# Patient Record
Sex: FEMALE | Race: WHITE | HISPANIC OR LATINO | URBAN - METROPOLITAN AREA
[De-identification: names, ages, dates, MRNs, and addresses within clinical notes are randomized per-mention and may not be internally consistent; named-entity substitution may affect disease eponyms.]

---

## 2017-03-31 ENCOUNTER — TELEPHONE (OUTPATIENT)
Dept: ENDOCRINOLOGY | Facility: CLINIC | Age: 37
End: 2017-03-31

## 2017-03-31 NOTE — TELEPHONE ENCOUNTER
----- Message from Wes Clark sent at 3/31/2017  2:12 PM CDT -----  Contact: Pt   Pt is requesting a call back from staff for scheduling    Pt has referral in system for rheumatology    Pt can be reached at [phone]

## 2019-10-17 ENCOUNTER — OFFICE VISIT (OUTPATIENT)
Dept: OBSTETRICS AND GYNECOLOGY | Facility: CLINIC | Age: 39
End: 2019-10-17
Payer: COMMERCIAL

## 2019-10-17 VITALS
HEIGHT: 62 IN | BODY MASS INDEX: 34.45 KG/M2 | DIASTOLIC BLOOD PRESSURE: 90 MMHG | WEIGHT: 187.19 LBS | SYSTOLIC BLOOD PRESSURE: 124 MMHG

## 2019-10-17 DIAGNOSIS — Z12.31 VISIT FOR SCREENING MAMMOGRAM: ICD-10-CM

## 2019-10-17 DIAGNOSIS — Z12.4 PAP SMEAR FOR CERVICAL CANCER SCREENING: ICD-10-CM

## 2019-10-17 DIAGNOSIS — Z01.419 VISIT FOR GYNECOLOGIC EXAMINATION: ICD-10-CM

## 2019-10-17 DIAGNOSIS — R10.2 PELVIC PAIN: Primary | ICD-10-CM

## 2019-10-17 DIAGNOSIS — N92.6 IRREGULAR PERIODS/MENSTRUAL CYCLES: ICD-10-CM

## 2019-10-17 LAB
BILIRUB SERPL-MCNC: NORMAL MG/DL
BLOOD URINE, POC: NORMAL
COLOR, POC UA: YELLOW
GLUCOSE UR QL STRIP: NORMAL
KETONES UR QL STRIP: NORMAL
LEUKOCYTE ESTERASE URINE, POC: NORMAL
NITRITE, POC UA: NORMAL
PH, POC UA: 6
PROTEIN, POC: NORMAL
SPECIFIC GRAVITY, POC UA: 1
UROBILINOGEN, POC UA: NORMAL

## 2019-10-17 PROCEDURE — 81002 URINALYSIS NONAUTO W/O SCOPE: CPT | Mod: S$GLB,,, | Performed by: NURSE PRACTITIONER

## 2019-10-17 PROCEDURE — 88175 CYTOPATH C/V AUTO FLUID REDO: CPT

## 2019-10-17 PROCEDURE — 87624 HPV HI-RISK TYP POOLED RSLT: CPT

## 2019-10-17 PROCEDURE — 99386 PR PREVENTIVE VISIT,NEW,40-64: ICD-10-PCS | Mod: 25,S$GLB,, | Performed by: NURSE PRACTITIONER

## 2019-10-17 PROCEDURE — 99999 PR PBB SHADOW E&M-EST. PATIENT-LVL III: CPT | Mod: PBBFAC,,, | Performed by: NURSE PRACTITIONER

## 2019-10-17 PROCEDURE — 99999 PR PBB SHADOW E&M-EST. PATIENT-LVL III: ICD-10-PCS | Mod: PBBFAC,,, | Performed by: NURSE PRACTITIONER

## 2019-10-17 PROCEDURE — 81002 POCT URINE DIPSTICK WITHOUT MICROSCOPE: ICD-10-PCS | Mod: S$GLB,,, | Performed by: NURSE PRACTITIONER

## 2019-10-17 PROCEDURE — 99386 PREV VISIT NEW AGE 40-64: CPT | Mod: 25,S$GLB,, | Performed by: NURSE PRACTITIONER

## 2019-10-17 RX ORDER — LORAZEPAM 1 MG/1
TABLET ORAL
COMMUNITY
Start: 2016-09-02

## 2019-10-17 RX ORDER — DEXTROAMPHETAMINE SACCHARATE, AMPHETAMINE ASPARTATE, DEXTROAMPHETAMINE SULFATE AND AMPHETAMINE SULFATE 5; 5; 5; 5 MG/1; MG/1; MG/1; MG/1
TABLET ORAL
COMMUNITY
Start: 2019-08-14

## 2019-10-17 NOTE — PROGRESS NOTES
"  CC: Annual  HPI: Pt is a 39 y.o.  female who presents for routine annual exam. She uses BTL for contraception. She does not want STD screening. She is , four kids at home.  with her today. She has not been to the GYN since 2017. She had a right dermoid cyst removal via Dr. Zuniga at [facility] in Jan 2017. States she had a "horrible" experience and never followed up again. She has been having intermittent R sided pelvic pain since the surgery. It is random when it occurs, lasts anywhere from minutes to hours. Motrin gives moderate relief. No pain on left side. Pain has been gradually worsening over the past 2 years. Pain varies but is sometimes sharp and then sore. Feels like pain is worse than after her cesareans. Cycles are irregular, longest without a cycle has been 2 months. Cycles are more painful than when she was younger, hesitant about hormonal birth control. She does not smoke cigarettes. Denies family hx of breast cancer. Takes Ativan nightly, uses Adderall prn. Sees an outside PCP regularly.       ROS:  GENERAL: Feeling well overall. Denies fever or chills.   SKIN: Denies rash or lesions.   HEAD: Denies head injury or headache.   NODES: Denies enlarged lymph nodes.   CHEST: Denies chest pain or shortness of breath.   CARDIOVASCULAR: Denies palpitations or left sided chest pain.   ABDOMEN: No abdominal pain, constipation, diarrhea, nausea, vomiting or rectal bleeding.   URINARY: No dysuria, hematuria, or burning on urination.  REPRODUCTIVE: See HPI.   BREASTS: Denies pain, lumps, or nipple discharge.   HEMATOLOGIC: No easy bruisability or excessive bleeding.   MUSCULOSKELETAL: Denies joint pain or swelling.   NEUROLOGIC: Denies syncope or weakness.   PSYCHIATRIC: Denies depression, anxiety or mood swings.    PE:   APPEARANCE: Well nourished, well developed,    Other  White female in no acute distress.  NODES: no cervical, supraclavicular, or inguinal lymphadenopathy  BREASTS: Symmetrical, no skin " changes or visible lesions. No palpable masses, nipple discharge or adenopathy bilaterally.  ABDOMEN: Soft. No tenderness or masses. No distention. No hernias palpated. No CVA tenderness.  VULVA: No lesions. Normal external female genitalia.  URETHRAL MEATUS: Normal size and location, no lesions, no prolapse.  URETHRA: No masses, tenderness, or prolapse.  VAGINA: Moist. No lesions or lacerations noted. No abnormal discharge present. No odor present.   CERVIX: No lesions or discharge. No cervical motion tenderness.   UTERUS: Normal size, regular shape, mobile, non-tender.  ADNEXA: No tenderness. No fullness or masses palpated in the adnexal regions.   ANUS PERINEUM: Normal.      Diagnosis:  1. Pelvic pain    2. Visit for gynecologic examination    3. Visit for screening mammogram    4. Pap smear for cervical cancer screening    5. Irregular periods/menstrual cycles        Plan:     Orders Placed This Encounter    HPV High Risk Genotypes, PCR    Mammo Digital Screening Bilat With CAD    US Pelvis Comp with Transvag NON-OB (xpd    POCT URINE DIPSTICK WITHOUT MICROSCOPE    POCT urine pregnancy    Liquid-based pap smear, screening     1. Pap/HPV  2. Pelvic u/s  3. Release of records from surgery  4. MMG    D/w pt f/u with Dr. Pena for pelvic pain if my work-up is negative. Pt chooses to focus on the pain vs cycles at this point. She is not a fan of medication and prefers to avoid them if possible.  Patient was counseled today on the new ACS guidelines for cervical cytology screening as well as the current recommendations for breast cancer screening. She was counseled to follow up with her PCP for other routine health maintenance. Counseling session lasted approximately 10 minutes, and all her questions were answered.    Follow-up with me in 1 year for routine exam; pap in 3 years.

## 2019-10-18 ENCOUNTER — PATIENT MESSAGE (OUTPATIENT)
Dept: OBSTETRICS AND GYNECOLOGY | Facility: CLINIC | Age: 39
End: 2019-10-18

## 2019-10-18 ENCOUNTER — HOSPITAL ENCOUNTER (OUTPATIENT)
Dept: RADIOLOGY | Facility: OTHER | Age: 39
Discharge: HOME | End: 2019-10-18
Attending: NURSE PRACTITIONER
Payer: COMMERCIAL

## 2019-10-18 DIAGNOSIS — R10.2 PELVIC PAIN: ICD-10-CM

## 2019-10-18 PROCEDURE — 76830 US PELVIS COMP WITH TRANSVAG NON-OB (XPD): ICD-10-PCS | Mod: 26,,, | Performed by: RADIOLOGY

## 2019-10-18 PROCEDURE — 76830 TRANSVAGINAL US NON-OB: CPT | Mod: TC

## 2019-10-18 PROCEDURE — 76856 US EXAM PELVIC COMPLETE: CPT | Mod: 26,,, | Performed by: RADIOLOGY

## 2019-10-18 PROCEDURE — 76856 US PELVIS COMP WITH TRANSVAG NON-OB (XPD): ICD-10-PCS | Mod: 26,,, | Performed by: RADIOLOGY

## 2019-10-18 PROCEDURE — 76830 TRANSVAGINAL US NON-OB: CPT | Mod: 26,,, | Performed by: RADIOLOGY

## 2019-10-18 PROCEDURE — 76856 US EXAM PELVIC COMPLETE: CPT | Mod: TC

## 2019-10-21 ENCOUNTER — TELEPHONE (OUTPATIENT)
Dept: OBSTETRICS AND GYNECOLOGY | Facility: CLINIC | Age: 39
End: 2019-10-21

## 2019-10-21 NOTE — TELEPHONE ENCOUNTER
----- Message from Mena Mitchell NP sent at 10/21/2019 12:06 PM CDT -----  Please contact pt- pelvic ultrasound was normal. Please set up with Dr. Pena for chronic pelvic pain.

## 2019-10-23 LAB
HPV HR 12 DNA CVX QL NAA+PROBE: NEGATIVE
HPV16 AG SPEC QL: NEGATIVE
HPV18 DNA SPEC QL NAA+PROBE: NEGATIVE

## 2019-10-24 ENCOUNTER — PATIENT MESSAGE (OUTPATIENT)
Dept: OBSTETRICS AND GYNECOLOGY | Facility: CLINIC | Age: 39
End: 2019-10-24

## 2019-10-25 ENCOUNTER — TELEPHONE (OUTPATIENT)
Dept: OBSTETRICS AND GYNECOLOGY | Facility: CLINIC | Age: 39
End: 2019-10-25

## 2019-10-25 ENCOUNTER — PATIENT MESSAGE (OUTPATIENT)
Dept: OBSTETRICS AND GYNECOLOGY | Facility: CLINIC | Age: 39
End: 2019-10-25

## 2019-10-25 NOTE — TELEPHONE ENCOUNTER
LVM:  Good Morning Ms Haddad, this is Lyndsay from Dr Pena's office.  NAWAF Mitchell asked us to call you to schedule you with Dr Pena's pelvic pain clinic.   Please call our office back at [phone].    Thank You

## 2019-10-25 NOTE — TELEPHONE ENCOUNTER
----- Message from Danilo Jonas MA sent at 10/24/2019  3:38 PM CDT -----  Ronald Umana,     This is the pt we discussed. Thanks for your help!!!!!!!!!    Liza,  I would like to schdeule  an appt with DR. DUARTE as Dr. Mitchell suggested.  Any evening around 430pm after Nove 6th would work for me. Thank You!

## 2019-11-15 ENCOUNTER — TELEPHONE (OUTPATIENT)
Dept: OBSTETRICS AND GYNECOLOGY | Facility: CLINIC | Age: 39
End: 2019-11-15

## 2019-11-25 ENCOUNTER — TELEPHONE (OUTPATIENT)
Dept: OBSTETRICS AND GYNECOLOGY | Facility: CLINIC | Age: 39
End: 2019-11-25

## 2019-11-25 ENCOUNTER — PATIENT MESSAGE (OUTPATIENT)
Dept: OBSTETRICS AND GYNECOLOGY | Facility: CLINIC | Age: 39
End: 2019-11-25

## 2019-11-25 ENCOUNTER — OFFICE VISIT (OUTPATIENT)
Dept: OBSTETRICS AND GYNECOLOGY | Facility: CLINIC | Age: 39
End: 2019-11-25
Payer: COMMERCIAL

## 2019-11-25 VITALS
BODY MASS INDEX: 34.64 KG/M2 | HEIGHT: 62 IN | DIASTOLIC BLOOD PRESSURE: 78 MMHG | WEIGHT: 188.25 LBS | SYSTOLIC BLOOD PRESSURE: 118 MMHG

## 2019-11-25 DIAGNOSIS — R10.31 CHRONIC RLQ PAIN: Primary | ICD-10-CM

## 2019-11-25 DIAGNOSIS — G89.29 CHRONIC RLQ PAIN: Primary | ICD-10-CM

## 2019-11-25 PROCEDURE — 99214 PR OFFICE/OUTPT VISIT, EST, LEVL IV, 30-39 MIN: ICD-10-PCS | Mod: 57,S$GLB,, | Performed by: OBSTETRICS & GYNECOLOGY

## 2019-11-25 PROCEDURE — 3008F BODY MASS INDEX DOCD: CPT | Mod: CPTII,S$GLB,, | Performed by: OBSTETRICS & GYNECOLOGY

## 2019-11-25 PROCEDURE — 99214 OFFICE O/P EST MOD 30 MIN: CPT | Mod: 57,S$GLB,, | Performed by: OBSTETRICS & GYNECOLOGY

## 2019-11-25 PROCEDURE — 99999 PR PBB SHADOW E&M-EST. PATIENT-LVL III: ICD-10-PCS | Mod: PBBFAC,,, | Performed by: OBSTETRICS & GYNECOLOGY

## 2019-11-25 PROCEDURE — 99999 PR PBB SHADOW E&M-EST. PATIENT-LVL III: CPT | Mod: PBBFAC,,, | Performed by: OBSTETRICS & GYNECOLOGY

## 2019-11-25 PROCEDURE — 3008F PR BODY MASS INDEX (BMI) DOCUMENTED: ICD-10-PCS | Mod: CPTII,S$GLB,, | Performed by: OBSTETRICS & GYNECOLOGY

## 2019-11-25 NOTE — PROGRESS NOTES
Subjective:       Patient ID: Kristin Haddad is a 39 y.o. female.    Chief Complaint:  Pelvic Pain      History of Present Illness  HPI  Pt is 39 y.o. with Patient's last menstrual period was 2019. here to discuss options for her chronic RLQ pain.    Pt has hx of 4 prior c/s (all through a vertical skin incision).  Pt started to have sharp pain on right side in .  Had an u/s which found a dermoid.  An open ovarian cystectomy was performed and at that time, she was told that she had lots of scar tissue abdominally.  Since the surgery, she has gained lots of weight.  But now she has more menstrual / PMS sx.  Pain is during and 1-2 weeks after period.  To her, it feels like scar tissue is stuck to everything -- but isolated to right side.    If she walks a lot, the incision feels like it would pop open.    Pain can be random.  Not moving makes pain better.  rx with motrin.      ocp's affected her hormonally.      Most recent u/s found the following:  FINDINGS:  The uterus measures 8.6 x 4.1 x 5.6 cm and has a homogeneous echotexture.  Endometrial stripe measures 0.7 cm.  The right and left ovaries measure 3.1 x 2.1 x 2.1 cm and 2.4 x 1.8 x 1.7 cm respectively.  The ovaries have a normal appearance.  Note is made of fluid within the cervix and several nabothian cysts.      Impression       Mild amount of fluid within the cervix.          GYN & OB History  Patient's last menstrual period was 2019.   Date of Last Pap: 10/25/2019    OB History    Para Term  AB Living   7 4     3 4   SAB TAB Ectopic Multiple Live Births           4      # Outcome Date GA Lbr Beny/2nd Weight Sex Delivery Anes PTL Lv   7 AB            6 AB            5 AB            4 Para      CS-Unspec   TARSHA   3 Para      CS-Unspec   TARSHA   2 Para      CS-Unspec   TARSHA   1 Para      CS-Unspec   TARSHA       Review of Systems  Review of Systems   Constitutional: Negative for activity change, appetite change and fatigue.   HENT:  Negative.  Negative for tinnitus.    Eyes: Negative.    Respiratory: Negative for cough and shortness of breath.    Cardiovascular: Negative for chest pain and palpitations.   Gastrointestinal: Negative.  Negative for abdominal pain, blood in stool, constipation, diarrhea and nausea.   Endocrine: Negative.  Negative for hot flashes.   Genitourinary: Positive for pelvic pain. Negative for dysmenorrhea, dyspareunia, dysuria, frequency, menstrual problem, vaginal discharge, urinary incontinence and postcoital bleeding.   Musculoskeletal: Negative for back pain and joint swelling.   Integumentary:  Negative for rash, breast mass, nipple discharge and breast skin changes.   Neurological: Negative.  Negative for headaches.   Hematological: Negative.  Does not bruise/bleed easily.   Psychiatric/Behavioral: The patient is not nervous/anxious.    Breast: negative.  Negative for mass, nipple discharge and skin changes          Objective:    Physical Exam:   Constitutional: She is oriented to person, place, and time. She appears well-developed and well-nourished. No distress.    HENT:   Head: Normocephalic and atraumatic.    Eyes: Pupils are equal, round, and reactive to light. Conjunctivae and lids are normal.    Neck: Normal range of motion and full passive range of motion without pain. Neck supple.    Cardiovascular: Normal rate and regular rhythm.  Exam reveals no edema.     Pulmonary/Chest: Effort normal and breath sounds normal. She has no wheezes.        Abdominal: Soft. Normal appearance and bowel sounds are normal. She exhibits no distension. There is no tenderness. There is no rebound and no guarding.         Genitourinary: Vagina normal and uterus normal. Pelvic exam was performed with patient supine. There is no tenderness or lesion on the right labia. There is no tenderness or lesion on the left labia. Uterus is not deviated, not fixed and not hosting fibroids. Cervix is normal. Right adnexum displays tenderness.  Right adnexum displays no mass. Left adnexum displays no mass and no tenderness. No rectocele, cystocele or unspecified prolapse of vaginal walls in the vagina. No vaginal discharge found. Labial bartholins normal.Cervix exhibits no motion tenderness and no discharge.   Genitourinary Comments: No mass palpated.  Normal pelvic floor             Musculoskeletal: Normal range of motion and moves all extremeties.       Neurological: She is alert and oriented to person, place, and time. She has normal strength.    Skin: Skin is warm and dry.    Psychiatric: She has a normal mood and affect. Her speech is normal and behavior is normal. Thought content normal. Her mood appears not anxious. She does not exhibit a depressed mood. She expresses no suicidal plans and no homicidal plans.          Assessment:        1. Chronic RLQ pain                Plan:      Kristin was seen today for pelvic pain.    Diagnoses and all orders for this visit:    Chronic RLQ pain  I had a long discussion with the patient and her significant other lasting approximately 45 min.  We will need to get all of her records so we understand exactly how much scar tissue she had.  Once we review her records, we can discuss different alternatives to therapy.  While she has had many surgeries, she may have scar tissue that is leading to this pulling sensation.  I do not think that starting birth control pills or progestin will help this pain. She has used anti-inflammatories to the point where it is upsetting her stomach.    If we did proceed with surgery, we would attempt laparoscopic a lysis of adhesions using the left upper quadrant port to start with.  She is aware that this may not help her pain. All questions were answered.

## 2019-11-25 NOTE — TELEPHONE ENCOUNTER
----- Message from Lyndsay Hunt MA sent at 11/25/2019  8:26 AM CST -----  Pls call pt  I have left her a message and sent her a message through PP to RS with Dr Pena as he has been called away for a meeting.  Would like to offer pt appt for Friday.  Please call and see if she can RS.  Thank you  Lyndsay

## 2019-11-25 NOTE — TELEPHONE ENCOUNTER
Called patient in regards to r/sing her appt for 11/25/19 at 2:15pm.  has been called out for a meeting. No answer LVM.

## 2020-01-05 ENCOUNTER — PATIENT MESSAGE (OUTPATIENT)
Dept: OBSTETRICS AND GYNECOLOGY | Facility: CLINIC | Age: 40
End: 2020-01-05

## 2020-01-06 DIAGNOSIS — R10.31 CHRONIC RLQ PAIN: Primary | ICD-10-CM

## 2020-01-06 DIAGNOSIS — G89.29 CHRONIC RLQ PAIN: Primary | ICD-10-CM

## 2020-01-06 RX ORDER — GABAPENTIN 100 MG/1
100 CAPSULE ORAL 3 TIMES DAILY
Qty: 90 CAPSULE | Refills: 2 | Status: SHIPPED | OUTPATIENT
Start: 2020-01-06 | End: 2021-01-05

## 2020-01-07 ENCOUNTER — PATIENT MESSAGE (OUTPATIENT)
Dept: OBSTETRICS AND GYNECOLOGY | Facility: CLINIC | Age: 40
End: 2020-01-07

## 2020-01-09 ENCOUNTER — OFFICE VISIT (OUTPATIENT)
Dept: OBSTETRICS AND GYNECOLOGY | Facility: CLINIC | Age: 40
End: 2020-01-09
Payer: COMMERCIAL

## 2020-01-09 VITALS
HEIGHT: 62 IN | BODY MASS INDEX: 34.16 KG/M2 | DIASTOLIC BLOOD PRESSURE: 72 MMHG | WEIGHT: 185.63 LBS | SYSTOLIC BLOOD PRESSURE: 126 MMHG

## 2020-01-09 DIAGNOSIS — R10.31 CHRONIC RLQ PAIN: Primary | ICD-10-CM

## 2020-01-09 DIAGNOSIS — G89.29 CHRONIC RLQ PAIN: Primary | ICD-10-CM

## 2020-01-09 PROCEDURE — 99214 OFFICE O/P EST MOD 30 MIN: CPT | Mod: S$GLB,,, | Performed by: OBSTETRICS & GYNECOLOGY

## 2020-01-09 PROCEDURE — 99999 PR PBB SHADOW E&M-EST. PATIENT-LVL III: CPT | Mod: PBBFAC,,, | Performed by: OBSTETRICS & GYNECOLOGY

## 2020-01-09 PROCEDURE — 99214 PR OFFICE/OUTPT VISIT, EST, LEVL IV, 30-39 MIN: ICD-10-PCS | Mod: S$GLB,,, | Performed by: OBSTETRICS & GYNECOLOGY

## 2020-01-09 PROCEDURE — 3008F BODY MASS INDEX DOCD: CPT | Mod: CPTII,S$GLB,, | Performed by: OBSTETRICS & GYNECOLOGY

## 2020-01-09 PROCEDURE — 3008F PR BODY MASS INDEX (BMI) DOCUMENTED: ICD-10-PCS | Mod: CPTII,S$GLB,, | Performed by: OBSTETRICS & GYNECOLOGY

## 2020-01-09 PROCEDURE — 99999 PR PBB SHADOW E&M-EST. PATIENT-LVL III: ICD-10-PCS | Mod: PBBFAC,,, | Performed by: OBSTETRICS & GYNECOLOGY

## 2020-01-09 RX ORDER — NORETHINDRONE ACETATE AND ETHINYL ESTRADIOL 1MG-20(21)
1 KIT ORAL DAILY
Qty: 30 TABLET | Refills: 11 | Status: SHIPPED | OUTPATIENT
Start: 2020-01-09 | End: 2020-12-14

## 2020-01-10 NOTE — PROGRESS NOTES
Subjective:       Patient ID: Kristin Haddad is a 39 y.o. female.    Chief Complaint:  Pelvic Pain      History of Present Illness  Pt is 39 y.o. Here in follow up.  Since our last visit, she continues to have daily pain. We did receive her medical records and reviewed her old op notes.  The last surgical procedure noted minimal scar tissue.  The patient reports having a larger midline laparotomy for dermoid mass removal.  Patient does state that all of her pain started after the surgery.  She is starting to miss her cycles.  She is having more adult all onset acne.  She is not sure if some of this is hormone related.  But she does have pain with movement.    She eats a healthy diet and has trouble losing weight.  This is the heaviest she has been.    Pelvic Pain   The patient's primary symptoms include pelvic pain. The patient's pertinent negatives include no vaginal discharge. This is a chronic problem. The current episode started more than 1 year ago. The problem occurs daily. The problem has been waxing and waning. The pain is moderate. The problem affects the right side. She is not pregnant. Associated symptoms include headaches and urgency. Pertinent negatives include no abdominal pain, anorexia, back pain, chills, constipation, diarrhea, discolored urine, dysuria, fever, flank pain, frequency, hematuria, nausea, painful intercourse, rash or vomiting. The symptoms are aggravated by activity, heavy lifting and menstrual cycle. She has tried NSAIDs for the symptoms. The treatment provided mild relief. She is sexually active. No, her partner does not have an STD. She uses tubal ligation for contraception. Her menstrual history has been irregular. Her past medical history is significant for an abdominal surgery, a  section, miscarriage and ovarian cysts. There is no history of an ectopic pregnancy, endometriosis, a gynecological surgery, herpes simplex, menorrhagia, metrorrhagia, perineal abscess, PID,  an STD, a terminated pregnancy or vaginosis.         GYN & OB History  Patient's last menstrual period was 2019.   Date of Last Pap: 10/25/2019    OB History    Para Term  AB Living   7 4     3 4   SAB TAB Ectopic Multiple Live Births           4      # Outcome Date GA Lbr Beny/2nd Weight Sex Delivery Anes PTL Lv   7 AB            6 AB            5 AB            4 Para      CS-Unspec   TARSHA   3 Para      CS-Unspec   TARSHA   2 Para      CS-Unspec   TARSHA   1 Para      CS-Unspec   TARSHA       Review of Systems  Review of Systems   Constitutional: Negative for activity change, appetite change, chills, fatigue and fever.   HENT: Negative.  Negative for tinnitus.    Eyes: Negative.    Respiratory: Negative for cough and shortness of breath.    Cardiovascular: Negative for chest pain and palpitations.   Gastrointestinal: Negative.  Negative for abdominal pain, anorexia, blood in stool, constipation, diarrhea, nausea and vomiting.   Endocrine: Negative.  Negative for hot flashes.   Genitourinary: Positive for pelvic pain and urgency. Negative for dysmenorrhea, dyspareunia, dysuria, flank pain, frequency, hematuria, menorrhagia, menstrual problem, vaginal discharge, urinary incontinence and postcoital bleeding.   Musculoskeletal: Negative for back pain and joint swelling.   Integumentary:  Negative for rash, breast mass, nipple discharge and breast skin changes.   Neurological: Positive for headaches.   Hematological: Negative.  Does not bruise/bleed easily.   Psychiatric/Behavioral: The patient is not nervous/anxious.    Breast: negative.  Negative for mass, nipple discharge and skin changes          Objective:    Physical Exam     def  Assessment:        1. Chronic RLQ pain                Plan:      Kristin was seen today for pelvic pain.    Diagnoses and all orders for this visit:    Chronic RLQ pain  -     norethindrone-ethinyl estradiol (JUNEL FE 1/20) 1 mg-20 mcg (21)/75 mg (7) per tablet; Take 1 tablet by  mouth once daily.  I had a long discussion with the patient and her significant other regarding the different treatment options.  Given that this all started acutely and her symptoms are constant, it does not appear to be endometriosis related.  We discussed how there could be a neuropathic component to her pain. This was the explanation for why we recommended gabapentin.  Patient was nervous to take it as it was listed as an anticonvulsant.  But now she understands.  We also discussed how some of the changes could be decreases in her hormone.  While it is not common for an ovarian cystectomy to lead to significant decreases, she may be experiencing a relative change.  Different treatment options include a trial of oral contraceptives to see if the additional estrogen helps.  If that does not work, we can restart the gabapentin.  If both of those do not work, we can discuss the utility of subsequent surgery.  I spent approximately 40 min with the patient her .  All questions were answered.  Follow-up in 3 months.

## 2020-01-29 ENCOUNTER — PATIENT MESSAGE (OUTPATIENT)
Dept: OBSTETRICS AND GYNECOLOGY | Facility: CLINIC | Age: 40
End: 2020-01-29

## 2020-01-29 ENCOUNTER — TELEPHONE (OUTPATIENT)
Dept: OBSTETRICS AND GYNECOLOGY | Facility: CLINIC | Age: 40
End: 2020-01-29

## 2020-01-29 DIAGNOSIS — N92.6 IRREGULAR PERIODS/MENSTRUAL CYCLES: Primary | ICD-10-CM

## 2020-01-29 RX ORDER — NORETHINDRONE 5 MG/1
10 TABLET ORAL DAILY
Qty: 20 TABLET | Refills: 0 | Status: SHIPPED | OUTPATIENT
Start: 2020-01-29 | End: 2020-02-08

## 2020-01-29 NOTE — TELEPHONE ENCOUNTER
LVM:  Good Morning Ms Colon, this is Lyndsay from Dr Pena's office. Please call our office back at [phone].    Thank You    Calling pt - she has sent staff a message regarding heavy bleeding

## 2020-07-27 ENCOUNTER — TELEPHONE (OUTPATIENT)
Dept: OBSTETRICS AND GYNECOLOGY | Facility: CLINIC | Age: 40
End: 2020-07-27

## 2020-07-27 NOTE — TELEPHONE ENCOUNTER
LVM:  Good Morning Ms Chilel, this is Lyndsay from Dr Pena's office. Please call our office back at [phone].    Thank You    Calling pt to let her know that Dr Pena's office received a fax from [employer] informing him that her Microgestin1/20 FE tabs are no longer covered under her insurance.  Pt will need to call insurance and find out what OCP's are on her formulary.

## 2020-12-11 DIAGNOSIS — R10.31 CHRONIC RLQ PAIN: ICD-10-CM

## 2020-12-11 DIAGNOSIS — G89.29 CHRONIC RLQ PAIN: ICD-10-CM

## 2020-12-14 RX ORDER — NORETHINDRONE ACETATE AND ETHINYL ESTRADIOL 1MG-20(21)
KIT ORAL
Qty: 28 TABLET | Refills: 4 | Status: SHIPPED | OUTPATIENT
Start: 2020-12-14

## 2022-11-30 ENCOUNTER — OFFICE VISIT (OUTPATIENT)
Dept: CARDIOLOGY | Facility: CLINIC | Age: 42
End: 2022-11-30
Payer: COMMERCIAL

## 2022-11-30 VITALS
HEIGHT: 62 IN | SYSTOLIC BLOOD PRESSURE: 129 MMHG | WEIGHT: 170 LBS | OXYGEN SATURATION: 96 % | DIASTOLIC BLOOD PRESSURE: 58 MMHG | HEART RATE: 92 BPM | BODY MASS INDEX: 31.28 KG/M2

## 2022-11-30 DIAGNOSIS — R00.2 PALPITATIONS: Primary | ICD-10-CM

## 2022-11-30 PROCEDURE — 3074F PR MOST RECENT SYSTOLIC BLOOD PRESSURE < 130 MM HG: ICD-10-PCS | Mod: CPTII,S$GLB,, | Performed by: NURSE PRACTITIONER

## 2022-11-30 PROCEDURE — 1160F PR REVIEW ALL MEDS BY PRESCRIBER/CLIN PHARMACIST DOCUMENTED: ICD-10-PCS | Mod: CPTII,S$GLB,, | Performed by: NURSE PRACTITIONER

## 2022-11-30 PROCEDURE — 93000 ELECTROCARDIOGRAM COMPLETE: CPT | Mod: S$GLB,,, | Performed by: INTERNAL MEDICINE

## 2022-11-30 PROCEDURE — 1160F RVW MEDS BY RX/DR IN RCRD: CPT | Mod: CPTII,S$GLB,, | Performed by: NURSE PRACTITIONER

## 2022-11-30 PROCEDURE — 99203 OFFICE O/P NEW LOW 30 MIN: CPT | Mod: 25,S$GLB,, | Performed by: NURSE PRACTITIONER

## 2022-11-30 PROCEDURE — 93000 EKG 12-LEAD: ICD-10-PCS | Mod: S$GLB,,, | Performed by: INTERNAL MEDICINE

## 2022-11-30 PROCEDURE — 99999 PR PBB SHADOW E&M-EST. PATIENT-LVL IV: CPT | Mod: PBBFAC,,, | Performed by: NURSE PRACTITIONER

## 2022-11-30 PROCEDURE — 99203 PR OFFICE/OUTPT VISIT, NEW, LEVL III, 30-44 MIN: ICD-10-PCS | Mod: 25,S$GLB,, | Performed by: NURSE PRACTITIONER

## 2022-11-30 PROCEDURE — 3008F PR BODY MASS INDEX (BMI) DOCUMENTED: ICD-10-PCS | Mod: CPTII,S$GLB,, | Performed by: NURSE PRACTITIONER

## 2022-11-30 PROCEDURE — 3078F DIAST BP <80 MM HG: CPT | Mod: CPTII,S$GLB,, | Performed by: NURSE PRACTITIONER

## 2022-11-30 PROCEDURE — 1159F PR MEDICATION LIST DOCUMENTED IN MEDICAL RECORD: ICD-10-PCS | Mod: CPTII,S$GLB,, | Performed by: NURSE PRACTITIONER

## 2022-11-30 PROCEDURE — 3008F BODY MASS INDEX DOCD: CPT | Mod: CPTII,S$GLB,, | Performed by: NURSE PRACTITIONER

## 2022-11-30 PROCEDURE — 99999 PR PBB SHADOW E&M-EST. PATIENT-LVL IV: ICD-10-PCS | Mod: PBBFAC,,, | Performed by: NURSE PRACTITIONER

## 2022-11-30 PROCEDURE — 3078F PR MOST RECENT DIASTOLIC BLOOD PRESSURE < 80 MM HG: ICD-10-PCS | Mod: CPTII,S$GLB,, | Performed by: NURSE PRACTITIONER

## 2022-11-30 PROCEDURE — 3074F SYST BP LT 130 MM HG: CPT | Mod: CPTII,S$GLB,, | Performed by: NURSE PRACTITIONER

## 2022-11-30 PROCEDURE — 1159F MED LIST DOCD IN RCRD: CPT | Mod: CPTII,S$GLB,, | Performed by: NURSE PRACTITIONER

## 2022-11-30 RX ORDER — NAPROXEN SODIUM 550 MG/1
550 TABLET ORAL EVERY 12 HOURS
COMMUNITY
Start: 2022-10-07

## 2022-11-30 RX ORDER — HYDROCHLOROTHIAZIDE 25 MG/1
25 TABLET ORAL DAILY PRN
COMMUNITY
Start: 2022-10-18

## 2022-11-30 RX ORDER — COVID-19 ANTIGEN TEST
KIT MISCELLANEOUS
COMMUNITY
Start: 2022-09-20

## 2022-11-30 RX ORDER — DOXYCYCLINE 40 MG/1
40 CAPSULE ORAL
COMMUNITY
Start: 2022-11-07

## 2022-11-30 RX ORDER — NORETHINDRONE ACETATE AND ETHINYL ESTRADIOL, AND FERROUS FUMARATE 1.5-30(21)
1 KIT ORAL
COMMUNITY
Start: 2022-11-11

## 2022-11-30 RX ORDER — LORAZEPAM 2 MG/1
1-2 TABLET ORAL DAILY PRN
COMMUNITY
Start: 2022-11-11

## 2022-11-30 NOTE — PROGRESS NOTES
Cardiology    11/30/2022  11:47 AM    Problem list  There is no problem list on file for this patient.      CC:  Palpitations    HPI:  Has heart flutters that will contribute to a cough.  Has a lot of stress at home, takes her anxiety meds and feels some improvement but still is having elevated heart rate. 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  The palpitations were thought to be from palpitations.  Rare social etoh, no tobacco. Drinks caffeine on occasion, does drink loaded teas occasionally.  She has tried to cut back on intake with the palpitations she is having. Palpitations are much worse at bedtime when laying down.  Alleviated by change in anxiety meds at first.  Could hear her heart pounding in her head while she was trying to read.  Can take up to 1-1.5 hours to fall asleep sometimes.  Can feel a pause- will be very fast, then a double beat and then she coughs.  It can take 4-5 coughs to stop the feeling.  Anxiety makes it worse. If she takes the adderall she is relaxed and tiredbut tries not to take it routinely. Does not necessarily fond it makes her feel heart issues.  Takes HCTZ for fluid retention. Family history of heart issues  Tends to not eat a lot of sodium or processed foods. Has been taking the HCTZ more routinely since 2019.  Had left leg swelling that was worsening and was just taking PRN, when she wakes up in the morning legs are fine, happens progressively throughout the day  Has baseline anemia and has been diagnosed with hyperthyroidism  Had significant ovarian cysts and these have been improved by OBCP      Medications  Current Outpatient Medications   Medication Sig Dispense Refill    dextroamphetamine-amphetamine (ADDERALL) 20 mg tablet Patient takes PRN      doxycycline (ORACEA) 40 mg capsule Take 40 mg by mouth.      FLOWFLEX COVID-19 AG HOME TEST Kit       hydroCHLOROthiazide (HYDRODIURIL) 25 MG tablet Take 25  mg by mouth daily as needed.      JUNEL FE 1.5/30, 28, 1.5 mg-30 mcg (21)/75 mg (7) tablet Take 1 tablet by mouth.      LORazepam (ATIVAN) 2 MG Tab Take 1-2 mg by mouth daily as needed.      naproxen sodium (ANAPROX) 550 MG tablet Take 550 mg by mouth every 12 (twelve) hours. Takes PRN      norethindrone-ethinyl estradiol (JUNEL FE 1/20) 1 mg-20 mcg (21)/75 mg (7) per tablet TAKE ONE TABLET BY MOUTH DAILY 28 tablet 4    gabapentin (NEURONTIN) 100 MG capsule Take 1 capsule (100 mg total) by mouth 3 (three) times daily. 90 capsule 2    LORazepam (ATIVAN) 1 MG tablet       norethindrone (AYGESTIN) 5 mg Tab Take 2 tablets (10 mg total) by mouth once daily. for 10 days (Patient not taking: Reported on 11/30/2022) 20 tablet 0     No current facility-administered medications for this visit.      Prior to Admission medications    Medication Sig Start Date End Date Taking? Authorizing Provider   dextroamphetamine-amphetamine (ADDERALL) 20 mg tablet Patient takes PRN 8/14/19  Yes Historical Provider   doxycycline (ORACEA) 40 mg capsule Take 40 mg by mouth. 11/7/22  Yes Historical Provider   FLOWFLEX COVID-19 AG HOME TEST Kit  9/20/22  Yes Historical Provider   hydroCHLOROthiazide (HYDRODIURIL) 25 MG tablet Take 25 mg by mouth daily as needed. 10/18/22  Yes Historical Provider   JUNE FE 1.5/30, 28, 1.5 mg-30 mcg (21)/75 mg (7) tablet Take 1 tablet by mouth. 11/11/22  Yes Historical Provider   LORazepam (ATIVAN) 2 MG Tab Take 1-2 mg by mouth daily as needed. 11/11/22  Yes Historical Provider   naproxen sodium (ANAPROX) 550 MG tablet Take 550 mg by mouth every 12 (twelve) hours. Takes PRN 10/7/22  Yes Historical Provider   norethindrone-ethinyl estradiol (JUNEL FE 1/20) 1 mg-20 mcg (21)/75 mg (7) per tablet TAKE ONE TABLET BY MOUTH DAILY 12/14/20  Yes Tom Pena MD   gabapentin (NEURONTIN) 100 MG capsule Take 1 capsule (100 mg total) by mouth 3 (three) times daily. 1/6/20 1/5/21  Tom Pena MD   LORazepam (ATIVAN) 1 MG  tablet  16   Historical Provider   norethindrone (AYGESTIN) 5 mg Tab Take 2 tablets (10 mg total) by mouth once daily. for 10 days  Patient not taking: Reported on 2022  Tom Pena MD         History  Past Medical History:   Diagnosis Date    Thyroid disease      Past Surgical History:   Procedure Laterality Date    BILATERAL TUBAL LIGATION       SECTION      DERMOID CYST  EXCISION Right 2017     Social History     Socioeconomic History    Marital status:    Tobacco Use    Smoking status: Never   Substance and Sexual Activity    Alcohol use: Yes    Drug use: Never    Sexual activity: Yes     Partners: Male     Birth control/protection: See Surgical Hx         Allergies  Review of patient's allergies indicates:   Allergen Reactions    Penicillins Anxiety, Nausea And Vomiting, Palpitations and Shortness Of Breath    Adhesive Anxiety, Blisters, Dermatitis, Hives, Itching, Palpitations, Rash and Swelling    Codeine Anxiety, Itching, Nausea And Vomiting and Palpitations    Latex, natural rubber Blisters, Dermatitis, Hives, Itching, Rash and Swelling    Morpholine analogues Anxiety, Hives, Itching and Nausea And Vomiting    Trimethadione/paramethadione Anxiety, Hives, Itching, Nausea And Vomiting and Palpitations         Review of Systems   Review of Systems   Constitutional: Negative for diaphoresis and malaise/fatigue.   HENT: Negative.     Cardiovascular:  Positive for irregular heartbeat and palpitations. Negative for chest pain, claudication, dyspnea on exertion, leg swelling, near-syncope, orthopnea, paroxysmal nocturnal dyspnea and syncope.   Respiratory:  Negative for shortness of breath.    Endocrine: Negative for polydipsia, polyphagia and polyuria.   Hematologic/Lymphatic: Does not bruise/bleed easily.   Gastrointestinal:  Negative for bloating, nausea and vomiting.   Genitourinary: Negative.    Neurological:  Negative for excessive daytime sleepiness, dizziness,  light-headedness, loss of balance and weakness.   Psychiatric/Behavioral:  The patient is nervous/anxious.    Allergic/Immunologic: Negative.        Physical Exam  Wt Readings from Last 1 Encounters:   11/30/22 77.1 kg (169 lb 15.6 oz)     BP Readings from Last 3 Encounters:   11/30/22 (!) 129/58   01/09/20 126/72   11/25/19 118/78     Pulse Readings from Last 1 Encounters:   11/30/22 92     Body mass index is 31.09 kg/m².    Physical Exam  Vitals and nursing note reviewed.   Constitutional:       Appearance: Normal appearance.   HENT:      Head: Normocephalic and atraumatic.      Mouth/Throat:      Mouth: Mucous membranes are moist.   Eyes:      Pupils: Pupils are equal, round, and reactive to light.   Cardiovascular:      Rate and Rhythm: Normal rate and regular rhythm.      Pulses:           Radial pulses are 2+ on the right side and 2+ on the left side.        Dorsalis pedis pulses are 2+ on the right side and 2+ on the left side.        Posterior tibial pulses are 2+ on the right side and 2+ on the left side.      Heart sounds: No murmur heard.  Pulmonary:      Effort: Pulmonary effort is normal. No respiratory distress.      Breath sounds: Normal breath sounds.   Abdominal:      General: There is no distension.      Tenderness: There is no abdominal tenderness.   Musculoskeletal:      Cervical back: Normal range of motion.      Right lower leg: No edema.      Left lower leg: No edema.   Skin:     General: Skin is warm and dry.      Findings: No erythema.   Neurological:      General: No focal deficit present.      Mental Status: She is alert.   Psychiatric:         Mood and Affect: Mood normal.         Behavior: Behavior normal.         Problem List Items Addressed This Visit          Cardiac/Vascular    Palpitations - Primary    Overview     Recommend Holter  Echo to assess LV function  Recommend avoiding caffeine           Current Assessment & Plan     As above         Relevant Orders    IN OFFICE EKG  12-LEAD (to Muse) (Completed)    Holter monitor - 48 hour    Echo             Follow Up  2-4 weeks      @Mayra Tanner DNP

## 2022-12-05 PROBLEM — R00.2 PALPITATIONS: Status: ACTIVE | Noted: 2022-12-05

## 2022-12-06 ENCOUNTER — PATIENT MESSAGE (OUTPATIENT)
Dept: CARDIOLOGY | Facility: CLINIC | Age: 42
End: 2022-12-06
Payer: COMMERCIAL

## 2022-12-08 ENCOUNTER — PATIENT MESSAGE (OUTPATIENT)
Dept: CARDIOLOGY | Facility: CLINIC | Age: 42
End: 2022-12-08
Payer: COMMERCIAL

## 2022-12-12 ENCOUNTER — PATIENT MESSAGE (OUTPATIENT)
Dept: CARDIOLOGY | Facility: CLINIC | Age: 42
End: 2022-12-12
Payer: COMMERCIAL

## 2023-01-11 ENCOUNTER — PATIENT MESSAGE (OUTPATIENT)
Dept: CARDIOLOGY | Facility: CLINIC | Age: 43
End: 2023-01-11
Payer: COMMERCIAL

## 2024-09-24 ENCOUNTER — OFFICE VISIT (OUTPATIENT)
Dept: CARDIOLOGY | Facility: CLINIC | Age: 44
End: 2024-09-24
Payer: COMMERCIAL

## 2024-09-24 VITALS
HEIGHT: 62 IN | DIASTOLIC BLOOD PRESSURE: 84 MMHG | HEART RATE: 74 BPM | WEIGHT: 183 LBS | BODY MASS INDEX: 33.68 KG/M2 | OXYGEN SATURATION: 100 % | SYSTOLIC BLOOD PRESSURE: 122 MMHG

## 2024-09-24 DIAGNOSIS — R60.0 LOWER EXTREMITY EDEMA: ICD-10-CM

## 2024-09-24 DIAGNOSIS — I10 HYPERTENSION, UNSPECIFIED TYPE: ICD-10-CM

## 2024-09-24 DIAGNOSIS — R94.31 NONSPECIFIC ABNORMAL ELECTROCARDIOGRAM (ECG) (EKG): ICD-10-CM

## 2024-09-24 DIAGNOSIS — R00.2 PALPITATIONS: Primary | ICD-10-CM

## 2024-09-24 PROCEDURE — 3079F DIAST BP 80-89 MM HG: CPT | Mod: CPTII,S$GLB,, | Performed by: INTERNAL MEDICINE

## 2024-09-24 PROCEDURE — 99204 OFFICE O/P NEW MOD 45 MIN: CPT | Mod: S$GLB,,, | Performed by: INTERNAL MEDICINE

## 2024-09-24 PROCEDURE — 3008F BODY MASS INDEX DOCD: CPT | Mod: CPTII,S$GLB,, | Performed by: INTERNAL MEDICINE

## 2024-09-24 PROCEDURE — 93005 ELECTROCARDIOGRAM TRACING: CPT | Mod: S$GLB,,, | Performed by: INTERNAL MEDICINE

## 2024-09-24 PROCEDURE — 1160F RVW MEDS BY RX/DR IN RCRD: CPT | Mod: CPTII,S$GLB,, | Performed by: INTERNAL MEDICINE

## 2024-09-24 PROCEDURE — 93010 ELECTROCARDIOGRAM REPORT: CPT | Mod: S$GLB,,, | Performed by: INTERNAL MEDICINE

## 2024-09-24 PROCEDURE — 3074F SYST BP LT 130 MM HG: CPT | Mod: CPTII,S$GLB,, | Performed by: INTERNAL MEDICINE

## 2024-09-24 PROCEDURE — 1159F MED LIST DOCD IN RCRD: CPT | Mod: CPTII,S$GLB,, | Performed by: INTERNAL MEDICINE

## 2024-09-24 PROCEDURE — 99999 PR PBB SHADOW E&M-EST. PATIENT-LVL IV: CPT | Mod: PBBFAC,,, | Performed by: INTERNAL MEDICINE

## 2024-09-24 NOTE — PROGRESS NOTES
[facility] - Cardiology [address]  Cardiology Clinic Note      Chief Complaint  Chief Complaint   Patient presents with    Palpitations       HPI:      44-year-old female with a past medical history hypertension? - does not sound like she has a formal diagnosis though her blood pressure is normal on hydrochlorothiazide and metoprolol daily, Holter 2022 PVC burden 1.5% echo 2022 normal EF normal diastolic function normal RV    No longer takes Adderall  The patient has had chronic palpitations that have improved after initiation of metoprolol  However, she still experiences palpitations to some degree  She states that she does take hydrochlorothiazide for chronic lower extremity edema    Medications  Current Outpatient Medications   Medication Sig Dispense Refill    doxycycline (ORACEA) 40 mg capsule Take 40 mg by mouth.      hydroCHLOROthiazide (HYDRODIURIL) 25 MG tablet Take 25 mg by mouth once daily.      JUNEL FE 1.5/30, 28, 1.5 mg-30 mcg (21)/75 mg (7) tablet Take 1 tablet by mouth.      LORazepam (ATIVAN) 2 MG Tab Take 1-2 mg by mouth daily as needed.      metoprolol succinate (TOPROL-XL) 25 MG 24 hr tablet TAKE 0.5 TABLETS (12.5 MG TOTAL) BY MOUTH ONCE DAILY. 45 tablet 3    naproxen sodium (ANAPROX) 550 MG tablet Take 550 mg by mouth every 12 (twelve) hours. Takes PRN      dextroamphetamine-amphetamine (ADDERALL) 20 mg tablet Patient takes PRN (Patient not taking: Reported on 9/24/2024)      FLOWFLEX COVID-19 AG HOME TEST Kit       gabapentin (NEURONTIN) 100 MG capsule Take 1 capsule (100 mg total) by mouth 3 (three) times daily. 90 capsule 2    LORazepam (ATIVAN) 1 MG tablet       norethindrone (AYGESTIN) 5 mg Tab Take 2 tablets (10 mg total) by mouth once daily. for 10 days (Patient not taking: Reported on 11/30/2022) 20 tablet 0    norethindrone-ethinyl estradiol (JUNEL FE 1/20) 1 mg-20 mcg (21)/75 mg (7) per tablet TAKE ONE TABLET BY MOUTH DAILY 28 tablet 4     No current facility-administered medications  for this visit.        History  Past Medical History:   Diagnosis Date    Thyroid disease      Past Surgical History:   Procedure Laterality Date    BILATERAL TUBAL LIGATION       SECTION      DERMOID CYST  EXCISION Right 2017     Social History     Socioeconomic History    Marital status:    Tobacco Use    Smoking status: Never   Substance and Sexual Activity    Alcohol use: Yes     Comment: occassionally    Drug use: Never    Sexual activity: Yes     Partners: Male     Birth control/protection: See Surgical Hx     Social Determinants of Health     Financial Resource Strain: Low Risk  (2024)    Overall Financial Resource Strain (CARDIA)     Difficulty of Paying Living Expenses: Not very hard   Food Insecurity: No Food Insecurity (2024)    Hunger Vital Sign     Worried About Running Out of Food in the Last Year: Never true     Ran Out of Food in the Last Year: Never true   Physical Activity: Inactive (2024)    Exercise Vital Sign     Days of Exercise per Week: 0 days     Minutes of Exercise per Session: 0 min   Stress: Stress Concern Present (2024)    [employer] [place] - Occupational Stress Questionnaire     Feeling of Stress : Very much   Housing Stability: Unknown (2024)    Housing Stability Vital Sign     Unable to Pay for Housing in the Last Year: No     No family history on file.     Allergies  Review of patient's allergies indicates:   Allergen Reactions    Penicillins Anxiety, Nausea And Vomiting, Palpitations and Shortness Of Breath    Adhesive Anxiety, Blisters, Dermatitis, Hives, Itching, Palpitations, Rash and Swelling    Codeine Anxiety, Itching, Nausea And Vomiting and Palpitations    Latex, natural rubber Blisters, Dermatitis, Hives, Itching, Rash and Swelling    Morpholine analogues Anxiety, Hives, Itching and Nausea And Vomiting    Trimethadione/paramethadione Anxiety, Hives, Itching, Nausea And Vomiting and Palpitations       Review of  Systems   ROS    Physical Exam  Vitals:    09/24/24 1421   BP: 122/84   Pulse: 74     Wt Readings from Last 1 Encounters:   09/24/24 83 kg (182 lb 15.7 oz)     Physical Exam    Labs  No visits with results within 6 Month(s) from this visit.   Latest known visit with results is:   Hospital Outpatient Visit on 12/07/2022   Component Date Value Ref Range Status    BSA 12/07/2022 1.83  m2 Final    TDI SEPTAL 12/07/2022 0.09  m/s Final    LV LATERAL E/E' RATIO 12/07/2022 5.85  m/s Final    LV SEPTAL E/E' RATIO 12/07/2022 8.44  m/s Final    IVC diameter 12/07/2022 1.59  cm Final    Left Ventricular Outflow Tract Shahnaz* 12/07/2022 0.58  cm/s Final    Left Ventricular Outflow Tract Shahnaz* 12/07/2022 1.47  mmHg Final    AORTIC VALVE CUSP SEPERATION 12/07/2022 1.87  cm Final    TDI LATERAL 12/07/2022 0.13  m/s Final    PV PEAK VELOCITY 12/07/2022 1.21  cm/s Final    LVIDd 12/07/2022 4.36  3.5 - 6.0 cm Final    IVS 12/07/2022 0.77  0.6 - 1.1 cm Final    Posterior Wall 12/07/2022 0.93  0.6 - 1.1 cm Final    Ao root annulus 12/07/2022 2.75  cm Final    LVIDs 12/07/2022 3.18  2.1 - 4.0 cm Final    FS 12/07/2022 27  28 - 44 % Final    Sinus 12/07/2022 2.68  cm Final    STJ 12/07/2022 2.75  cm Final    LV mass 12/07/2022 116.80  g Final    LA size 12/07/2022 2.89  cm Final    RVDD 12/07/2022 1.52  cm Final    Left Ventricle Relative Wall Thick* 12/07/2022 0.43  cm Final    AV mean gradient 12/07/2022 4  mmHg Final    AV valve area 12/07/2022 2.17  cm2 Final    AV Velocity Ratio 12/07/2022 0.61   Final    AV index (prosthetic) 12/07/2022 0.56   Final    MV valve area p 1/2 method 12/07/2022 6.13  cm2 Final    E/A ratio 12/07/2022 1.10   Final    Mean e' 12/07/2022 0.11  m/s Final    E wave deceleration time 12/07/2022 140.52  msec Final    Pulm vein S/D ratio 12/07/2022 1.17   Final    LVOT diameter 12/07/2022 2.22  cm Final    LVOT area 12/07/2022 3.9  cm2 Final    LVOT peak eleazar 12/07/2022 0.77  m/s Final    LVOT peak VTI 12/07/2022  15.40  cm Final    Ao peak jn 12/07/2022 1.26  m/s Final    Ao VTI 12/07/2022 27.4  cm Final    LVOT stroke volume 12/07/2022 59.58  cm3 Final    AV peak gradient 12/07/2022 6  mmHg Final    E/E' ratio 12/07/2022 6.91  m/s Final    MV Peak E Jn 12/07/2022 0.76  m/s Final    MV stenosis pressure 1/2 time 12/07/2022 35.86  ms Final    MV Peak A Jn 12/07/2022 0.69  m/s Final    PV Peak S Jn 12/07/2022 0.54  m/s Final    PV Peak D Jn 12/07/2022 0.46  m/s Final    LV Systolic Volume 12/07/2022 40.36  mL Final    LV Systolic Volume Index 12/07/2022 22.7  mL/m2 Final    LV Diastolic Volume 12/07/2022 85.86  mL Final    LV Diastolic Volume Index 12/07/2022 48.24  mL/m2 Final    LV Mass Index 12/07/2022 66  g/m2 Final    RA Major Axis 12/07/2022 3.59  cm Final    Left Atrium Minor Axis 12/07/2022 2.75  cm Final    Left Atrium Major Axis 12/07/2022 4.07  cm Final    LA Volume Index (Mod) 12/07/2022 11.9  mL/m2 Final    LA Vol (MOD) 12/07/2022 21.14  cm3 Final    RA Width 12/07/2022 2.70  cm Final    Right Atrial Pressure (from IVC) 12/07/2022 3  mmHg Final    EF 12/07/2022 55  % Final    TV resting pulmonary artery pressu* 12/07/2022 8  mmHg Final    TR Max Jn 12/07/2022 1.10  m/s Final    Triscuspid Valve Regurgitation Pea* 12/07/2022 5  mmHg Final       EKG  Reviewed    Echo   Results for orders placed or performed during the hospital encounter of 12/07/22   Echo   Result Value Ref Range    BSA 1.83 m2    TDI SEPTAL 0.09 m/s    LV LATERAL E/E' RATIO 5.85 m/s    LV SEPTAL E/E' RATIO 8.44 m/s    IVC diameter 1.59 cm    Left Ventricular Outflow Tract Mean Velocity 0.58 cm/s    Left Ventricular Outflow Tract Mean Gradient 1.47 mmHg    AORTIC VALVE CUSP SEPERATION 1.87 cm    TDI LATERAL 0.13 m/s    PV PEAK VELOCITY 1.21 cm/s    LVIDd 4.36 3.5 - 6.0 cm    IVS 0.77 0.6 - 1.1 cm    Posterior Wall 0.93 0.6 - 1.1 cm    Ao root annulus 2.75 cm    LVIDs 3.18 2.1 - 4.0 cm    FS 27 28 - 44 %    Sinus 2.68 cm    STJ 2.75 cm    LV  mass 116.80 g    LA size 2.89 cm    RVDD 1.52 cm    Left Ventricle Relative Wall Thickness 0.43 cm    AV mean gradient 4 mmHg    AV valve area 2.17 cm2    AV Velocity Ratio 0.61     AV index (prosthetic) 0.56     MV valve area p 1/2 method 6.13 cm2    E/A ratio 1.10     Mean e' 0.11 m/s    E wave deceleration time 140.52 msec    Pulm vein S/D ratio 1.17     LVOT diameter 2.22 cm    LVOT area 3.9 cm2    LVOT peak jn 0.77 m/s    LVOT peak VTI 15.40 cm    Ao peak jn 1.26 m/s    Ao VTI 27.4 cm    LVOT stroke volume 59.58 cm3    AV peak gradient 6 mmHg    E/E' ratio 6.91 m/s    MV Peak E Jn 0.76 m/s    MV stenosis pressure 1/2 time 35.86 ms    MV Peak A Jn 0.69 m/s    PV Peak S Jn 0.54 m/s    PV Peak D Jn 0.46 m/s    LV Systolic Volume 40.36 mL    LV Systolic Volume Index 22.7 mL/m2    LV Diastolic Volume 85.86 mL    LV Diastolic Volume Index 48.24 mL/m2    LV Mass Index 66 g/m2    RA Major Axis 3.59 cm    Left Atrium Minor Axis 2.75 cm    Left Atrium Major Axis 4.07 cm    LA Volume Index (Mod) 11.9 mL/m2    LA Vol (MOD) 21.14 cm3    RA Width 2.70 cm    Right Atrial Pressure (from IVC) 3 mmHg    EF 55 %    TV resting pulmonary artery pressure 8 mmHg    TR Max Jn 1.10 m/s    Triscuspid Valve Regurgitation Peak Gradient 5 mmHg    Narrative    · The left ventricle is normal in size with concentric remodeling and   normal systolic function.  · The estimated ejection fraction is 55%.  · Normal left ventricular diastolic function.  · Normal right ventricular size with normal right ventricular systolic   function.          Imaging  No results found.    Prior coronary angiogram / intervention:  None    Assessment and Plan  1. Nonspecific abnormal electrocardiogram (ECG) (EKG)  Has had echocardiogram in the past  - IN OFFICE EKG 12-LEAD (to Muse)    2. Palpitations  Repeat Holter further plan to follow  Improved on metoprolol may need to titrate  If PVC burden is high may try exercise stress treadmill  - IN OFFICE EKG  12-LEAD (to Muse)  - Holter monitor - 48 hour; Future    3. Hypertension, unspecified type  Questionable diagnosis blood pressure normal on hydrochlorothiazide and metoprolol    4. Lower extremity edema  Suspect lower extremity venous insufficiency  Check lower extremity venous duplex for insufficiency  - US Lower Extremity Veins Bilateral Insufficiency; Future        Follow Up  Follow up in about 3 months (around 12/24/2024).      Agus Hendrix MD, [facility], [facility]  Interventional Cardiology     Total professional time spent for the encounter: 45 minutes  Time was spent preparing to see the patient, reviewing results of prior testing, obtaining and/or reviewing separately obtained history, performing a medically appropriate examination and interview, counseling and educating the patient/family, ordering medications/tests/procedures, referring and communicating with other health care professionals, documenting clinical information in the electronic health record, and independently interpreting results.

## 2024-09-25 LAB
OHS QRS DURATION: 98 MS
OHS QTC CALCULATION: 452 MS

## 2024-10-10 ENCOUNTER — TELEPHONE (OUTPATIENT)
Dept: CARDIOLOGY | Facility: CLINIC | Age: 44
End: 2024-10-10
Payer: COMMERCIAL

## 2024-10-10 NOTE — TELEPHONE ENCOUNTER
Spoke with patient, informed of test results per provider message and recommendations.  Patient verbalized understanding.    Scheduled echo and stress test for 10/17/2024.  Testing instructions explained to patient.  Patient verbalized understanding.

## 2024-10-10 NOTE — TELEPHONE ENCOUNTER
----- Message from Agus Hendrix MD sent at 10/10/2024  3:45 PM CDT -----  Please let her know that she had relatively frequent PVCs/extra beats coming from the bottom of her heart  The usual workup for this is to repeat the echo and do an exercise treadmill test which I have ordered  Please schedule her and let me know if she has any questions  Tell her this is routine and I frequently find PVCs on the Holter  ----- Message -----  From: Agus Hendrix MD  Sent: 10/10/2024   3:28 PM CDT  To: Agus Hendrix MD

## 2025-02-12 ENCOUNTER — NURSE TRIAGE (OUTPATIENT)
Dept: ADMINISTRATIVE | Facility: CLINIC | Age: 45
End: 2025-02-12
Payer: COMMERCIAL

## 2025-02-12 NOTE — TELEPHONE ENCOUNTER
Pt has been experiencing pelvic pain on and off for sometime now. States that for past month she has been having aches to hips, and a  burning sensation. Will take naproxen that will help ease pain. Does have upcoming GYN appointment. Advised to be seen in ED. Verbalized understanding.    Reason for Disposition   SEVERE pelvic pain and present > 1 hour    Additional Information   Negative: Shock suspected (e.g., cold/pale/clammy skin, too weak to stand, low BP, rapid pulse)   Negative: Passed out (e.g., fainted, lost consciousness, blacked out and was not responding)   Negative: Sounds like a life-threatening emergency to the triager   Negative: SEVERE pelvic pain (e.g., excruciating) and vomiting    Protocols used: Pelvic Pain - Female-A-OH

## 2025-02-19 ENCOUNTER — OFFICE VISIT (OUTPATIENT)
Dept: OBSTETRICS AND GYNECOLOGY | Facility: CLINIC | Age: 45
End: 2025-02-19
Payer: COMMERCIAL

## 2025-02-19 ENCOUNTER — RESULTS FOLLOW-UP (OUTPATIENT)
Dept: OBSTETRICS AND GYNECOLOGY | Facility: CLINIC | Age: 45
End: 2025-02-19

## 2025-02-19 VITALS
HEIGHT: 62 IN | DIASTOLIC BLOOD PRESSURE: 84 MMHG | SYSTOLIC BLOOD PRESSURE: 122 MMHG | WEIGHT: 182.56 LBS | BODY MASS INDEX: 33.6 KG/M2

## 2025-02-19 DIAGNOSIS — Z30.41 ORAL CONTRACEPTIVE PILL SURVEILLANCE: ICD-10-CM

## 2025-02-19 DIAGNOSIS — N64.4 BREAST PAIN: Primary | ICD-10-CM

## 2025-02-19 DIAGNOSIS — R10.2 PELVIC PAIN: Primary | ICD-10-CM

## 2025-02-19 DIAGNOSIS — R10.2 PELVIC PRESSURE IN FEMALE: ICD-10-CM

## 2025-02-19 DIAGNOSIS — N39.3 SUI (STRESS URINARY INCONTINENCE, FEMALE): ICD-10-CM

## 2025-02-19 DIAGNOSIS — Z12.31 VISIT FOR SCREENING MAMMOGRAM: ICD-10-CM

## 2025-02-19 LAB
B-HCG UR QL: NEGATIVE
BILIRUB SERPL-MCNC: NEGATIVE MG/DL
BLOOD URINE, POC: ABNORMAL
CLARITY, POC UA: ABNORMAL
COLOR, POC UA: YELLOW
CTP QC/QA: YES
GLUCOSE UR QL STRIP: 100
KETONES UR QL STRIP: ABNORMAL
LEUKOCYTE ESTERASE URINE, POC: NEGATIVE
NITRITE, POC UA: NEGATIVE
PH, POC UA: 5
PROTEIN, POC: NEGATIVE
SPECIFIC GRAVITY, POC UA: 1
UROBILINOGEN, POC UA: NORMAL

## 2025-02-19 PROCEDURE — 99999 PR PBB SHADOW E&M-EST. PATIENT-LVL I: CPT | Mod: PBBFAC,,, | Performed by: STUDENT IN AN ORGANIZED HEALTH CARE EDUCATION/TRAINING PROGRAM

## 2025-02-19 RX ORDER — DOXYCYCLINE HYCLATE 50 MG/1
50 CAPSULE, GELATIN COATED ORAL
COMMUNITY
Start: 2025-02-12

## 2025-02-19 RX ORDER — DROSPIRENONE AND ETHINYL ESTRADIOL 0.02-3(28)
1 KIT ORAL DAILY
Qty: 84 TABLET | Refills: 1 | Status: SHIPPED | OUTPATIENT
Start: 2025-02-19 | End: 2025-03-21

## 2025-02-19 RX ORDER — NAPROXEN 500 MG/1
500 TABLET ORAL EVERY 12 HOURS
COMMUNITY
Start: 2024-12-17

## 2025-02-19 NOTE — PROGRESS NOTES
"HPI:  Kristin is a 44 y.o.  seen today for:  Chronic pelvic pain  Ongoing for years  Has history of CS x 4 and R dermoid removal, all via same VML incision  Sometimes incision hurts  Discomfort feels like a lot of pelvic pressure  She is on COCs for history of recurrent ovarian cysts - this has helped with cyst prevention and her current pain is nothing like what she would experience with the cysts  Sparse dark thick hairs on chin  Mild ROXANA  Due for MMG  Last pap normal thinks ? or     OB History    Para Term  AB Living   7 4   3 4   SAB IAB Ectopic Multiple Live Births       4      # Outcome Date GA Lbr Beny/2nd Weight Sex Type Anes PTL Lv   7 AB            6 AB            5 AB            4 Para      CS-Unspec   TARSHA   3 Para      CS-Unspec   TARSHA   2 Para      CS-Unspec   TARSHA   1 Para      CS-Unspec   TARSHA        /84   Ht 5' 2.01" (1.575 m)   Wt 82.8 kg (182 lb 8.7 oz)   BMI 33.38 kg/m²      PE:   APPEARANCE: Well nourished, well developed, no acute distress.  ABDOMEN: Soft. No tenderness or masses. No distention. No hernias palpated. No CVA tenderness.  VULVA: No lesions. Normal external female genitalia.  URETHRAL MEATUS: Normal size and location, no lesions, no prolapse.  URETHRA: No masses, tenderness, or prolapse.  VAGINA: Moist. No lesions or lacerations noted. No abnormal discharge present. No odor present.   CERVIX: No lesions or discharge. No cervical motion tenderness.   UTERUS: Normal size, regular shape, mobile, non-tender.  ADNEXA: No tenderness. No fullness or masses palpated in the adnexal regions.   ANUS PERINEUM: Normal.      Diagnosis:  1. Pelvic pain    2. Pelvic pressure in female    3. ROXANA (stress urinary incontinence, female)    4. Oral contraceptive pill surveillance    5. Visit for screening mammogram        Plan:   Kristin was seen today for pelvic pain.    Diagnoses and all orders for this visit:    Pelvic pain  -     US Pelvis Comp with Transvag NON-OB " (xpd; Future  -     Ambulatory Referral/Consult to Physical/Occupational Therapy; Future  -     POCT Urine Pregnancy  -     POCT URINE DIPSTICK WITHOUT MICROSCOPE    Pelvic pressure in female  -     US Pelvis Comp with Transvag NON-OB (xpd; Future  -     Ambulatory Referral/Consult to Physical/Occupational Therapy; Future  -     POCT Urine Pregnancy  -     POCT URINE DIPSTICK WITHOUT MICROSCOPE    ROXANA (stress urinary incontinence, female)  -     Ambulatory Referral/Consult to Physical/Occupational Therapy; Future    Oral contraceptive pill surveillance  -     drospirenone-ethinyl estradioL (ROOPA) 3-0.02 mg per tablet; Take 1 tablet by mouth once daily.    Visit for screening mammogram  -     Mammo Digital Screening Bilat w/ Frandy (XPD); Future    We will switch to roopa, a/w less androgenic symptoms. Reports no contraindications to COCs.    We discussed various causes of pelvic pain. US ordered to eval for structural causes though given parity and surgical history suspect component of pelvic floor  dysfunction as well as adhesive disease.  Also consideration for pelvic congestion.     RTC 3-4 months for f/u and WWE/pap    Joselyn Santiago MD  Obstetrics & Gynecology   [facility]     I spent a total of 49 minutes on the day of the visit.  This includes face to face time and non-face to face time preparing to see the patient (eg, review of tests), obtaining and/or reviewing separately obtained history, documenting clinical information in the electronic or other health record, independently interpreting results and communicating results to the patient/family/caregiver, or care coordinator.

## 2025-03-07 ENCOUNTER — HOSPITAL ENCOUNTER (OUTPATIENT)
Dept: RADIOLOGY | Facility: HOSPITAL | Age: 45
Discharge: HOME | End: 2025-03-07
Attending: STUDENT IN AN ORGANIZED HEALTH CARE EDUCATION/TRAINING PROGRAM
Payer: COMMERCIAL

## 2025-03-07 DIAGNOSIS — Z12.31 VISIT FOR SCREENING MAMMOGRAM: ICD-10-CM

## 2025-03-07 PROCEDURE — 77063 BREAST TOMOSYNTHESIS BI: CPT | Mod: 26,,, | Performed by: RADIOLOGY

## 2025-03-07 PROCEDURE — 77067 SCR MAMMO BI INCL CAD: CPT | Mod: 26,,, | Performed by: RADIOLOGY

## 2025-03-07 PROCEDURE — 77067 SCR MAMMO BI INCL CAD: CPT | Mod: TC

## 2025-03-11 ENCOUNTER — TELEPHONE (OUTPATIENT)
Dept: RADIOLOGY | Facility: HOSPITAL | Age: 45
End: 2025-03-11
Payer: COMMERCIAL

## 2025-03-11 NOTE — TELEPHONE ENCOUNTER
Spoke with patient and explained mammogram findings.Patient expressed understanding of results. Patient scheduled abnormal mammogram follow up appointment at [facility] on 3/13/2025.

## 2025-03-14 ENCOUNTER — TELEPHONE (OUTPATIENT)
Dept: RADIOLOGY | Facility: HOSPITAL | Age: 45
End: 2025-03-14
Payer: COMMERCIAL

## 2025-03-18 ENCOUNTER — HOSPITAL ENCOUNTER (OUTPATIENT)
Dept: RADIOLOGY | Facility: HOSPITAL | Age: 45
Discharge: HOME | End: 2025-03-18
Attending: STUDENT IN AN ORGANIZED HEALTH CARE EDUCATION/TRAINING PROGRAM
Payer: COMMERCIAL

## 2025-03-18 DIAGNOSIS — N64.4 BREAST PAIN: ICD-10-CM

## 2025-03-18 PROCEDURE — 76642 ULTRASOUND BREAST LIMITED: CPT | Mod: TC,RT

## 2025-03-18 PROCEDURE — 76642 ULTRASOUND BREAST LIMITED: CPT | Mod: 26,RT,, | Performed by: RADIOLOGY

## 2025-08-19 ENCOUNTER — LAB VISIT (OUTPATIENT)
Dept: LAB | Facility: OTHER | Age: 45
End: 2025-08-19
Attending: STUDENT IN AN ORGANIZED HEALTH CARE EDUCATION/TRAINING PROGRAM
Payer: COMMERCIAL

## 2025-08-19 ENCOUNTER — OFFICE VISIT (OUTPATIENT)
Dept: OBSTETRICS AND GYNECOLOGY | Facility: CLINIC | Age: 45
End: 2025-08-19
Payer: COMMERCIAL

## 2025-08-19 VITALS
DIASTOLIC BLOOD PRESSURE: 82 MMHG | WEIGHT: 189.38 LBS | SYSTOLIC BLOOD PRESSURE: 118 MMHG | BODY MASS INDEX: 34.63 KG/M2

## 2025-08-19 DIAGNOSIS — G89.29 CHRONIC PELVIC PAIN IN FEMALE: ICD-10-CM

## 2025-08-19 DIAGNOSIS — Z12.11 COLON CANCER SCREENING: ICD-10-CM

## 2025-08-19 DIAGNOSIS — N95.1 PERIMENOPAUSAL SYMPTOMS: ICD-10-CM

## 2025-08-19 DIAGNOSIS — R10.2 CHRONIC PELVIC PAIN IN FEMALE: ICD-10-CM

## 2025-08-19 DIAGNOSIS — Z12.4 CERVICAL CANCER SCREENING: Primary | ICD-10-CM

## 2025-08-19 LAB
ESTRADIOL SERPL HS-MCNC: 61 PG/ML
FSH SERPL-ACNC: 7.82 MIU/ML

## 2025-08-19 PROCEDURE — 84270 ASSAY OF SEX HORMONE GLOBUL: CPT

## 2025-08-19 PROCEDURE — 1159F MED LIST DOCD IN RCRD: CPT | Mod: CPTII,S$GLB,, | Performed by: STUDENT IN AN ORGANIZED HEALTH CARE EDUCATION/TRAINING PROGRAM

## 2025-08-19 PROCEDURE — 82670 ASSAY OF TOTAL ESTRADIOL: CPT

## 2025-08-19 PROCEDURE — 99999 PR PBB SHADOW E&M-EST. PATIENT-LVL III: CPT | Mod: PBBFAC,,, | Performed by: STUDENT IN AN ORGANIZED HEALTH CARE EDUCATION/TRAINING PROGRAM

## 2025-08-19 PROCEDURE — 3079F DIAST BP 80-89 MM HG: CPT | Mod: CPTII,S$GLB,, | Performed by: STUDENT IN AN ORGANIZED HEALTH CARE EDUCATION/TRAINING PROGRAM

## 2025-08-19 PROCEDURE — 3008F BODY MASS INDEX DOCD: CPT | Mod: CPTII,S$GLB,, | Performed by: STUDENT IN AN ORGANIZED HEALTH CARE EDUCATION/TRAINING PROGRAM

## 2025-08-19 PROCEDURE — 84402 ASSAY OF FREE TESTOSTERONE: CPT

## 2025-08-19 PROCEDURE — 3074F SYST BP LT 130 MM HG: CPT | Mod: CPTII,S$GLB,, | Performed by: STUDENT IN AN ORGANIZED HEALTH CARE EDUCATION/TRAINING PROGRAM

## 2025-08-19 PROCEDURE — 87624 HPV HI-RISK TYP POOLED RSLT: CPT | Performed by: STUDENT IN AN ORGANIZED HEALTH CARE EDUCATION/TRAINING PROGRAM

## 2025-08-19 PROCEDURE — 83001 ASSAY OF GONADOTROPIN (FSH): CPT

## 2025-08-19 PROCEDURE — 99396 PREV VISIT EST AGE 40-64: CPT | Mod: S$GLB,,, | Performed by: STUDENT IN AN ORGANIZED HEALTH CARE EDUCATION/TRAINING PROGRAM

## 2025-08-19 PROCEDURE — 36415 COLL VENOUS BLD VENIPUNCTURE: CPT

## 2025-08-19 RX ORDER — NITROFURANTOIN 25; 75 MG/1; MG/1
100 CAPSULE ORAL EVERY 12 HOURS
COMMUNITY
Start: 2025-08-11

## 2025-08-19 RX ORDER — LORAZEPAM 2 MG/1
1-2 TABLET ORAL DAILY PRN
COMMUNITY
Start: 2025-06-25

## 2025-08-22 LAB
W ALBUMIN: 3.8 G/DL
W SEX HORMONE BINDING GLOBULIN: 34 NMOL/L
W TESTOSTERONE, BIOAVAIL: 5.6 NG/DL
W TESTOSTERONE, FREE: 3.2 PG/ML
W TESTOSTERONE, TOTAL, MS: 27 NG/DL

## 2025-08-25 ENCOUNTER — RESULTS FOLLOW-UP (OUTPATIENT)
Dept: OBSTETRICS AND GYNECOLOGY | Facility: CLINIC | Age: 45
End: 2025-08-25
Payer: COMMERCIAL

## 2025-08-25 DIAGNOSIS — R39.89 URETHRAL PAIN: Primary | ICD-10-CM
